# Patient Record
Sex: MALE | Race: WHITE | URBAN - METROPOLITAN AREA
[De-identification: names, ages, dates, MRNs, and addresses within clinical notes are randomized per-mention and may not be internally consistent; named-entity substitution may affect disease eponyms.]

---

## 2021-11-05 ENCOUNTER — OFFICE VISIT (OUTPATIENT)
Dept: CARDIOLOGY CLINIC | Age: 21
End: 2021-11-05
Payer: MEDICAID

## 2021-11-05 VITALS
HEIGHT: 73 IN | OXYGEN SATURATION: 98 % | DIASTOLIC BLOOD PRESSURE: 84 MMHG | SYSTOLIC BLOOD PRESSURE: 128 MMHG | HEART RATE: 81 BPM | BODY MASS INDEX: 23.88 KG/M2 | WEIGHT: 180.2 LBS

## 2021-11-05 DIAGNOSIS — R00.2 PALPITATIONS: ICD-10-CM

## 2021-11-05 PROCEDURE — 93246 EXT ECG>7D<15D RECORDING: CPT | Performed by: INTERNAL MEDICINE

## 2021-11-05 PROCEDURE — 99204 OFFICE O/P NEW MOD 45 MIN: CPT | Performed by: INTERNAL MEDICINE

## 2021-11-05 RX ORDER — MIRTAZAPINE 30 MG/1
30 TABLET, FILM COATED ORAL NIGHTLY
COMMUNITY
Start: 2021-10-22

## 2021-11-05 RX ORDER — BUSPIRONE HYDROCHLORIDE 10 MG/1
10 TABLET ORAL 2 TIMES DAILY
COMMUNITY
Start: 2021-10-22

## 2021-11-05 RX ORDER — QUETIAPINE FUMARATE 100 MG/1
100 TABLET, FILM COATED ORAL NIGHTLY
COMMUNITY

## 2021-11-05 RX ORDER — HYDROXYZINE PAMOATE 25 MG/1
CAPSULE ORAL
COMMUNITY
Start: 2021-10-24

## 2021-11-05 NOTE — LETTER
[address] [place] [address] Cardiology [address]  [address]  Phone: [phone]  Fax: [phone]    Kodi Blevins MD        November 5, 2021     Patient: Christopher Sherman   YOB: 2000   Date of Visit: 11/5/2021       To Whom it May Concern:    Christopher Sherman was seen in my clinic on 11/5/2021. If you have any questions or concerns, please don't hesitate to call.     Sincerely,         Kodi Blevins MD

## 2021-11-05 NOTE — LETTER
[address] [place] [address] Cardiology [place] [address] [address] [address]  Phone: [phone]  Fax: [phone]    Rocio Delgado MD        November 5, 2021     Patient: Kellen Atkins   YOB: 2000   Date of Visit: 11/5/2021       To Whom It May Concern:    Required transportation to an appointment. If you have any questions or concerns, please don't hesitate to call.     Sincerely,        Rocio Delgado MD

## 2021-11-05 NOTE — PROGRESS NOTES
range of motion. Neck supple. No rigidity. No JVD present. · Cardiovascular: Normal rate, regular rhythm, S1&S2 and intact distal pulses. · Pulmonary/Chest: Bilateral respiratory sounds. No wheezes. No rhonchi. · Abdominal: Soft. Bowel sounds present. No distension, No tenderness. · Musculoskeletal: No tenderness. No edema    · Lymphadenopathy: Has no cervical adenopathy. · Neurological: Alert and oriented. Cranial nerve appears intact, No Gross deficit   · Skin: Skin is warm and dry. No rash noted. · Psychiatric: Has a normal mood, affect and behavior     Labs:  Reviewed. No results found for: TSHREFLEX, TSH, CREATININE, AMIOD, AST, ALT    ECG: declined    Studies:   1. Event monitor:   None     2. Echo:   11/4/2021 ([facility])  Summary:   The left ventricular wall motion is normal.   The left ventricular function is normal.   Overall left ventricular ejection fraction is estimated to be 55-60%. There is mild mitral regurgitation. There is borderline mitral valve prolapse. Right ventricular systolic pressure is indeterminate due to poorly visualized   tricuspid regurgitation. 3. Stress Test:    none    4. Cath:   None    I independently reviewed the ECG, MCOT, echocardiogram, stress test, and coronary angiography/PCI results and used them for my plan of care. Procedures:  1. none    Assessment/Plan:   1. Palpitations   -ECG today shows pt declined   -ECG in ED showed EAR 10/29/2021   -Echo was normal 11/4/2021   -We will order a 2 week holter to determine if he has an arrhythmia that is causing his symptoms. The patient has high anxiety but have to rule out ectopic atrial rhythm as a cause of some of the \"panick attacks\" episodes. He's stopped using drugs but still has panick attacks. Will do an event monitor to see if there is a cardiac correlation. We discussed treatment options for EAR which include medications and/or ablation.  Will discuss further management after event monitor. 2. Polysubstance use  - reports that he's not been using. 3. Bipolar disorder.   - Has an upcoming appointment. Plan  · 2 week event monitor. Thank you for allowing me to participate in the care of Casey Sánchez. All questions and concerns were addressed to the patient/family. Alternatives to my treatment were discussed. Scribe attestation: This note was scribed in the presence of Ana Maria Bello MD by Oscar Lopez RN    Physician attestation: The scribe's documentation has been prepared under my direction and has been personally reviewed by me in its entirety. I confirm that the note above reflects all work, treatment, procedures, and medical decision making performed by me.      Ana Maria Bello MD  Cardiac Electrophysiology  [facility]

## 2021-11-08 ENCOUNTER — TELEPHONE (OUTPATIENT)
Dept: CARDIOLOGY CLINIC | Age: 21
End: 2021-11-08

## 2021-11-08 NOTE — TELEPHONE ENCOUNTER
Echo reviewed. EF is normal with mild MR. No changes from treatment plan discussed with Dr. Jason Reddy at [address] last week.  He needs to complete his event monitor    GORGE Orta-CNP

## 2021-12-06 ENCOUNTER — TELEPHONE (OUTPATIENT)
Dept: CARDIOLOGY CLINIC | Age: 21
End: 2021-12-06

## 2021-12-06 NOTE — TELEPHONE ENCOUNTER
Spoke with patient's mother, she states patient dropped off the monitor at the  at [facility]. Could not answer whether that was the  of [facility] or our office ck. 100. One of the MA's went to the [facility]  and there were no monitors there, there is no registration scanned into patient's chart. There are no reports in [facility] [employer] for the [address] [place] [address]

## 2021-12-07 NOTE — TELEPHONE ENCOUNTER
Left message on machine for patient to return call to office. Monitor has not been returned. We have checked up stairs at the main hospital entrance and they do not have any monitors for our office. Several of us have checked the entire office and there are no monitors here for the patient. Please advise for the monitor is not available to upload, and patient just keeps saying he dropped it off at the  of [facility], but will not specify to which [facility] location he dropped it off.

## 2021-12-27 PROCEDURE — 93248 EXT ECG>7D<15D REV&INTERPJ: CPT | Performed by: INTERNAL MEDICINE
